# Patient Record
Sex: MALE | Race: BLACK OR AFRICAN AMERICAN | NOT HISPANIC OR LATINO | URBAN - METROPOLITAN AREA
[De-identification: names, ages, dates, MRNs, and addresses within clinical notes are randomized per-mention and may not be internally consistent; named-entity substitution may affect disease eponyms.]

---

## 2024-11-26 ENCOUNTER — TELEPHONE (OUTPATIENT)
Dept: IMMUNOLOGY | Facility: CLINIC | Age: 24
End: 2024-11-26
Payer: COMMERCIAL

## 2024-11-26 NOTE — TELEPHONE ENCOUNTER
Time Spent: 15 mins   EIS returned pt call from 11/25/24  Pt requested to transfer care   Pt has hx of care from [facility] and  [facility] (Dr. BRITT Fofana)   Pt does not recall hx of care from  [facility]   Pt confirmed medication access (30 days remaining biktarvy)     PLAN:   EIS will f/U to link pt to care after receiving December NPV availability   EIS will complete RW

## 2024-12-26 NOTE — TELEPHONE ENCOUNTER
Time Spent: 5 mins   EIS reached out to pt. Pt available to answer call.  Pt confirmed NPV   Pt confirmed pickup address for transportation support ([facility])     PLAN:  EIS will complete RW

## 2024-12-27 ENCOUNTER — APPOINTMENT (OUTPATIENT)
Dept: IMMUNOLOGY | Facility: CLINIC | Age: 24
End: 2024-12-27
Payer: MEDICARE

## 2024-12-31 ENCOUNTER — APPOINTMENT (OUTPATIENT)
Dept: IMMUNOLOGY | Facility: CLINIC | Age: 24
End: 2024-12-31
Payer: MEDICARE

## 2024-12-31 NOTE — TELEPHONE ENCOUNTER
Time Spent: 15 mins   EIS called pt at 9h16 to confirm pt availability to receive transportation   Pt requested to re-schedule d/t financial barrier   EIS advised that RW is able to assist with financial barrier   Pt confirmed availability   Transportation support ([employer]) did not dispatch ride d/t  cancelling   EIS reached out to pt at 9h42. Pt unable to answer call.   EIS left VM and text message to re-dispatch transportation for NPV.   EIS reached out to pt at 10h52.  Pt unable to answer call.   EIS left VM and text message to re-schedule NPV.     PLAN:   EIS available to re-schedule NPV   EIS will complete RW

## 2025-01-13 NOTE — TELEPHONE ENCOUNTER
Time Spent: 5 mins  EIS reached out to pt. Pt unable to answer call.   EIS left VM and text message.  EIS called to assist pt to re-schedule NPV    PLAN:   EIS available to re-schedule NPV   EIS will complete RW

## 2025-02-10 NOTE — TELEPHONE ENCOUNTER
Time Spent: 5 mins  EIS reached out to pt. Pt unable to answer call.   EIS left VM and text message.  EIS reached out to re=schedule NPV     PLAN:   EIS available to re-schedule NPV   EIS will complete RW

## 2025-04-01 NOTE — TELEPHONE ENCOUNTER
Time Spent: 15 mins   EIS reached out to pt to link to NPV. Pt available to answer call.  Pt stated late afternoon availability preferred d/t work schedule   Pt confirmed medication access (biktarvy) from [facility]   Pt confirmed new insurance coverage ([employer])   EIS conference called  at [facility]   Pt linked to NPV at [facility] for 04/03/2025 at 15h30    PLAN:   EIS will f/U to cofirm receipt of care of NPV at [facility]